# Patient Record
Sex: MALE | Race: WHITE | NOT HISPANIC OR LATINO | Employment: FULL TIME | URBAN - METROPOLITAN AREA
[De-identification: names, ages, dates, MRNs, and addresses within clinical notes are randomized per-mention and may not be internally consistent; named-entity substitution may affect disease eponyms.]

---

## 2020-11-15 ENCOUNTER — NON-PROVIDER VISIT (OUTPATIENT)
Dept: URGENT CARE | Facility: CLINIC | Age: 55
End: 2020-11-15

## 2020-11-15 DIAGNOSIS — Z02.1 PRE-EMPLOYMENT DRUG SCREENING: ICD-10-CM

## 2020-11-15 LAB
AMP AMPHETAMINE: NORMAL
COC COCAINE: NORMAL
INT CON NEG: NORMAL
INT CON POS: NORMAL
MET METHAMPHETAMINES: NORMAL
OPI OPIATES: NORMAL
PCP PHENCYCLIDINE: NORMAL
POC DRUG COMMENT 753798-OCCUPATIONAL HEALTH: NEGATIVE
THC: NORMAL

## 2020-11-15 PROCEDURE — 80305 DRUG TEST PRSMV DIR OPT OBS: CPT | Performed by: NURSE PRACTITIONER

## 2021-07-30 ENCOUNTER — HOSPITAL ENCOUNTER (EMERGENCY)
Facility: MEDICAL CENTER | Age: 56
End: 2021-07-30
Attending: EMERGENCY MEDICINE
Payer: OTHER MISCELLANEOUS

## 2021-07-30 ENCOUNTER — OCCUPATIONAL MEDICINE (OUTPATIENT)
Dept: URGENT CARE | Facility: CLINIC | Age: 56
End: 2021-07-30
Payer: OTHER MISCELLANEOUS

## 2021-07-30 ENCOUNTER — APPOINTMENT (OUTPATIENT)
Dept: RADIOLOGY | Facility: IMAGING CENTER | Age: 56
End: 2021-07-30
Attending: NURSE PRACTITIONER
Payer: OTHER MISCELLANEOUS

## 2021-07-30 VITALS
HEIGHT: 65 IN | WEIGHT: 161 LBS | TEMPERATURE: 98.3 F | SYSTOLIC BLOOD PRESSURE: 128 MMHG | HEART RATE: 77 BPM | RESPIRATION RATE: 20 BRPM | DIASTOLIC BLOOD PRESSURE: 76 MMHG | BODY MASS INDEX: 26.82 KG/M2 | OXYGEN SATURATION: 99 %

## 2021-07-30 VITALS
RESPIRATION RATE: 14 BRPM | SYSTOLIC BLOOD PRESSURE: 141 MMHG | WEIGHT: 161 LBS | DIASTOLIC BLOOD PRESSURE: 93 MMHG | HEIGHT: 65 IN | TEMPERATURE: 98.4 F | HEART RATE: 89 BPM | BODY MASS INDEX: 26.82 KG/M2 | OXYGEN SATURATION: 96 %

## 2021-07-30 DIAGNOSIS — M25.562 ACUTE PAIN OF LEFT KNEE: ICD-10-CM

## 2021-07-30 DIAGNOSIS — M25.461 PAIN AND SWELLING OF RIGHT KNEE: ICD-10-CM

## 2021-07-30 DIAGNOSIS — M25.662 DECREASED RANGE OF MOTION (ROM) OF LEFT KNEE: ICD-10-CM

## 2021-07-30 DIAGNOSIS — M25.462 EFFUSION OF LEFT KNEE: ICD-10-CM

## 2021-07-30 DIAGNOSIS — M25.561 PAIN AND SWELLING OF RIGHT KNEE: ICD-10-CM

## 2021-07-30 LAB
APPEARANCE FLD: NORMAL
BASOPHILS NFR FLD: 0 %
BODY FLD TYPE: NORMAL
BODY FLD TYPE: NORMAL
COLOR FLD: YELLOW
CRYSTALS FLD MICRO: NORMAL
EOSINOPHIL NFR FLD: 0 %
GLUCOSE FLD-MCNC: 141 MG/DL
GRAM STN SPEC: NORMAL
HISTIOCYTES NFR FLD: 0 %
LYMPHOCYTES NFR FLD: 19 %
MESOTHL CELL NFR FLD: 0 %
MONONUC CELLS NFR FLD: 36 %
NEUTROPHILS NFR FLD: 45 %
PROT FLD-MCNC: 2.6 G/DL
RBC # FLD: NORMAL CELLS/UL
SIGNIFICANT IND 70042: NORMAL
SITE SITE: NORMAL
SOURCE SOURCE: NORMAL
WBC # FLD: 106 CELLS/UL
WBC OTHER NFR FLD: 0 %

## 2021-07-30 PROCEDURE — 80500 HCHG CLINICAL PATH CONSULT-LIMITED: CPT

## 2021-07-30 PROCEDURE — 87205 SMEAR GRAM STAIN: CPT

## 2021-07-30 PROCEDURE — 87070 CULTURE OTHR SPECIMN AEROBIC: CPT

## 2021-07-30 PROCEDURE — 20610 DRAIN/INJ JOINT/BURSA W/O US: CPT

## 2021-07-30 PROCEDURE — 73562 X-RAY EXAM OF KNEE 3: CPT | Mod: TC,LT | Performed by: NURSE PRACTITIONER

## 2021-07-30 PROCEDURE — 89060 EXAM SYNOVIAL FLUID CRYSTALS: CPT

## 2021-07-30 PROCEDURE — 84157 ASSAY OF PROTEIN OTHER: CPT

## 2021-07-30 PROCEDURE — 700101 HCHG RX REV CODE 250: Performed by: EMERGENCY MEDICINE

## 2021-07-30 PROCEDURE — 82945 GLUCOSE OTHER FLUID: CPT

## 2021-07-30 PROCEDURE — A9270 NON-COVERED ITEM OR SERVICE: HCPCS | Performed by: EMERGENCY MEDICINE

## 2021-07-30 PROCEDURE — 99284 EMERGENCY DEPT VISIT MOD MDM: CPT

## 2021-07-30 PROCEDURE — 700102 HCHG RX REV CODE 250 W/ 637 OVERRIDE(OP): Performed by: EMERGENCY MEDICINE

## 2021-07-30 PROCEDURE — 89051 BODY FLUID CELL COUNT: CPT

## 2021-07-30 PROCEDURE — 99203 OFFICE O/P NEW LOW 30 MIN: CPT | Performed by: NURSE PRACTITIONER

## 2021-07-30 RX ORDER — LIDOCAINE HYDROCHLORIDE AND EPINEPHRINE BITARTRATE 20; .01 MG/ML; MG/ML
10 INJECTION, SOLUTION SUBCUTANEOUS ONCE
Status: DISCONTINUED | OUTPATIENT
Start: 2021-07-30 | End: 2021-07-30

## 2021-07-30 RX ORDER — IBUPROFEN 600 MG/1
600 TABLET ORAL
Qty: 20 TABLET | Refills: 0 | Status: SHIPPED | OUTPATIENT
Start: 2021-07-30 | End: 2023-09-05

## 2021-07-30 RX ORDER — IBUPROFEN 600 MG/1
600 TABLET ORAL ONCE
Status: COMPLETED | OUTPATIENT
Start: 2021-07-30 | End: 2021-07-30

## 2021-07-30 RX ORDER — HYDROCODONE BITARTRATE AND ACETAMINOPHEN 5; 325 MG/1; MG/1
1-2 TABLET ORAL EVERY 6 HOURS PRN
Qty: 10 TABLET | Refills: 0 | Status: SHIPPED | OUTPATIENT
Start: 2021-07-30 | End: 2021-08-01

## 2021-07-30 RX ORDER — LIDOCAINE HYDROCHLORIDE AND EPINEPHRINE BITARTRATE 20; .01 MG/ML; MG/ML
10 INJECTION, SOLUTION SUBCUTANEOUS ONCE
Status: COMPLETED | OUTPATIENT
Start: 2021-07-30 | End: 2021-07-30

## 2021-07-30 RX ADMIN — IBUPROFEN 600 MG: 600 TABLET ORAL at 15:09

## 2021-07-30 RX ADMIN — LIDOCAINE HYDROCHLORIDE AND EPINEPHRINE 10 ML: 20; 10 INJECTION, SOLUTION INFILTRATION; PERINEURAL at 15:11

## 2021-07-30 ASSESSMENT — PAIN DESCRIPTION - PAIN TYPE: TYPE: ACUTE PAIN

## 2021-07-30 NOTE — LETTER
[facility] [place] [id number] [facility] [address] [place] [address]  Phone:  [phone] - Fax:  [phone]   [facility] Progress Report and Disability Certification  Date of Service: 7/30/2021   No Show:  No  Date / Time of Next Visit: 8/2/2021 11:30 AM   Claim Information   Patient Name: Duran Wilcox  Claim Number:     Employer: [employer]  Date of Injury: 7/26/2021     Insurer / TPA: [employer]  ID / SSN:     Occupation: Temp  Diagnosis: Diagnoses of Acute pain of left knee, Decreased range of motion (ROM) of left knee, and Pain and swelling of left knee were pertinent to this visit.    Medical Information   Related to Industrial Injury? Yes    Subjective Complaints:  DOI: 7/26/21  First Visit  Patient presents today with complaint of acute pain to the left knee.  Initial injury occurred on 7/26.  Patient states he was stacking boxes when he fell into a hole where a dock ramp was removed.  He fell through the hole with his right leg, and injured the right lateral thigh and hip area.  He states that has improved.  States he was initially having pain walking on his right leg and was limping on his left leg to compensate.  Over the last 48 hours, has noted increasing pain to the left knee on the opposite leg.  States now he is unable to bend the left knee without significant pain.   Objective Findings: There is swelling and erythema noted over the left knee.  Positive point tenderness to the anterior knee. Patient is unable to bend the left knee.  The knee is warm to palpation. Unable to assess laxity secondary to point tenderness.No pain above or below the knee, no point tenderness to the posterior knee.   Pre-Existing Condition(s):     Assessment:   Initial Visit    Status: Additional Care Required  Permanent Disability:No    Plan:      Diagnostics: X-ray    Comments:       Disability Information   Status: Temporarily Totally Disabled    From:   7/30/2021  Through: 8/2/2021 Restrictions are: Temporary   Physical Restrictions   Sitting:    Standing:    Stooping:    Bending:      Squatting:    Walking:    Climbing:    Pushing:      Pulling:    Other:    Reaching Above Shoulder (L):   Reaching Above Shoulder (R):       Reaching Below Shoulder (L):    Reaching Below Shoulder (R):      Not to exceed Weight Limits   Carrying(hrs):   Weight Limit(lb):   Lifting(hrs):   Weight  Limit(lb):     Comments: The left knee is erythematous with significant point tenderness and patient is unable to bend the knee.  I am concerned for the possibility of a septic arthritis.  X-ray is negative.  At this time, patient is referred to [facility] for further evaluation.  Consulted occupational health Dr. Carlos Melissa regarding this patient.     Repetitive Actions   Hands: i.e. Fine Manipulations from Grasping:     Feet: i.e. Operating Foot Controls:     Driving / Operate Machinery:     Provider Name:   Cathey J Hamman, A.P.N. Physician Signature:  Physician Name:     Clinic Name / Location: [address] [address] Clinic Phone Number: Dept: [phone]   Appointment Time: 11:00 Am Visit Start Time: 11:46 AM   Check-In Time:  11:12 Am Visit Discharge Time:  12:30 PM   Original-Treating Physician or Chiropractor    Page 2-Insurer/TPA    Page 3-Employer    Page 4-Employee

## 2021-07-30 NOTE — PROGRESS NOTES
"Subjective:      Duran Wilcox is a 56 y.o. male who presents with Other (NEW WC DOI: 07/26/21 (L) knee, cant bend, painful x 3 days )      DOI: 7/26/21  First Visit  Patient presents today with complaint of acute pain to the left knee.  Initial injury occurred on 7/26.  Patient states he was stacking boxes when he fell into a hole where a dock ramp was removed.  He fell through the hole with his right leg, and injured the right lateral thigh and hip area.  He states that has improved.  States he was initially having pain walking on his right leg and was limping on his left leg to compensate.  Over the last 48 hours, has noted increasing pain to the left knee on the opposite leg.  States now he is unable to bend the left knee without significant pain.     Other  This is a new problem. Episode onset: 7/26/21. The problem occurs constantly. The problem has been gradually worsening. Exacerbated by: walking, movement. He has tried nothing for the symptoms. The treatment provided no relief.       Review of Systems   Musculoskeletal:        Left knee pain and swelling   All other systems reviewed and are negative.         Objective:     /76 (BP Location: Left arm, Patient Position: Standing, BP Cuff Size: Adult)   Pulse 77   Temp 36.8 °C (98.3 °F) (Temporal)   Resp 20   Ht 1.651 m (5' 5\")   Wt 73 kg (161 lb)   SpO2 99%   BMI 26.79 kg/m²      Physical Exam  Vitals reviewed.   Constitutional:       Appearance: Normal appearance.   Musculoskeletal:        Legs:       Comments: Contusion and point tenderness to the lateral right thigh.    Erythema, redness, and point tenderness to the anterior left knee.  Area is warm to touch.   Neurological:      Mental Status: He is alert.   Psychiatric:         Mood and Affect: Mood normal.         Behavior: Behavior normal.         Thought Content: Thought content normal.         Judgment: Judgment normal.         There is swelling and mild erythema noted over the left knee. "  Positive point tenderness to the anterior knee.  Range of motion is decreased with bending.  Unable to assess for laxity secondary to pain and tenderness.     XR knee:       7/30/2021 12:08 PM     HISTORY/REASON FOR EXAM:  Knee pain     TECHNIQUE/EXAM DESCRIPTION AND NUMBER OF VIEWS:  3 views of the LEFT knee.     COMPARISON: None     FINDINGS:     BONE MINERALIZATION: Normal.  JOINTS: Small knee effusion. No erosions.  FRACTURE: None.  DISLOCATION: None.  SOFT TISSUES: No mass.     IMPRESSION:     Small knee effusion. No fracture or dislocation.      Consulted Dr. Carlos Melissa in occupational health regarding this patient.  Patient has extreme decrease in range of motion of the left knee.  He is unable to bend the knee.  There is also significant point tenderness to the anterior knee with erythema and warmth.  I am concerned about the possibility of a septic joint.  Patient will be transferred to ER for further evaluation.     Assessment/Plan:   Acute pain of the left knee  Decreased range of motion of the left knee  Left knee swelling    D 39 for restrictions  See note above  Patient referred on to ER for further evaluation at this time     There are no diagnoses linked to this encounter.

## 2021-07-30 NOTE — LETTER
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    Medical Information   Related to Industrial Injury? Yes    Subjective Complaints:  Knee pain/swelling   Objective Findings: edema   Pre-Existing Condition(s): None known   Assessment:   Initial Visit    Status: Additional Care Required  Permanent Disability:No    Plan: MedicationTransfer Care    Diagnostics: Laboratory    Comments:       Disability Information   Status: Temporarily Totally Disabled    From:     Through:   Restrictions are:     Physical Restrictions   Sitting:    Standing:    Stooping:    Bending:      Squatting:    Walking:    Climbing:    Pushing:      Pulling:    Other:    Reaching Above Shoulder (L):   Reaching Above Shoulder (R):       Reaching Below Shoulder (L):    Reaching Below Shoulder (R):      Not to exceed Weight Limits   Carrying(hrs):   Weight Limit(lb):   Lifting(hrs):   Weight  Limit(lb):     Comments: Bed rest for the next 2 days.  Will need to follow up with [facility] this upcoming week for furhther therapy.    Repetitive Actions   Hands: i.e. Fine Manipulations from Grasping:     Feet: i.e. Operating Foot Controls:     Driving / Operate Machinery:     Physician Name: Abimael Daily Physician Signature: ABIMAEL Heller M.D. e-Signature:  , Medical Director   Clinic Name / Location: [facility], EMERGENCY DEPT  [address]  [address]  [phone]     Clinic Phone Number: Dept: [phone]   Appointment Time:  Visit Start Time:    Check-In Time:  1:35 PM Visit Discharge Time:    Original-Treating Physician or Chiropractor    Page 2-Insurer/TPA    Page 3-Employer    Page 4-Employee

## 2021-07-30 NOTE — LETTER
Duran Wilcox was seen and treated in our emergency department on 7/30/2021.  He may return to work on 08/03/2021, or until cleared by Work Diligent Board Member Services.    If you have any questions or concerns, please don't hesitate to call.      Emergency Department

## 2021-07-30 NOTE — LETTER
"EMPLOYEE’S CLAIM FOR COMPENSATION/ REPORT OF INITIAL TREATMENT  FORM C-4    EMPLOYEE’S CLAIM - PROVIDE ALL INFORMATION REQUESTED   First Name  Duran Last Name  Brenden Birthdate                    1965                Sex  male Claim Number   Home Address  [address] Age  56 y.o. Height  1.651 m (5' 5\") Weight  73 kg (161 lb) [facility] Zip  [address] Telephone  [phone] (home)    Mailing Address  [address] 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 (Be specific an answer in detail. Use additional sheet if necessary)  Fell into hole when dock ramp was removed   If you believe that you have an occupational disease, when did you first have knowledge of the disability and it relationship to your employment?  N/A Witnesses to the Accident  Two Managers      Nature of Injury or Occupational Disease  Strain  Part(s) of Body Injured or Affected  Knee (L), Upper Leg (R),     I certify that the above is true and correct to the best of my knowledge and that I have provided this information in order to obtain the benefits of [place] " [place]  I hereby authorize any physician, chiropractor, surgeon, practitioner, or other person, any hospital, including [facility] hospital, any medical service organization, any insurance company, or other institution or organization to release to each other, any medical or other information, including benefits paid or payable, pertinent to this injury or disease, except information relative to diagnosis, treatment and/or counseling for AIDS, psychological conditions, alcohol or controlled substances, for which I must give specific authorization.  A Photostat of this authorization shall be as valid as the original.     Date   Place   Employee’s Signature   THIS REPORT MUST BE COMPLETED AND MAILED WITHIN 3 WORKING DAYS OF TREATMENT   Place  [facility] [place] [facility]  Name of Facility  [facility]   Date  7/30/2021 Diagnosis  (M25.562) Acute pain of left knee  (M25.662) Decreased range of motion (ROM) of left knee  (M25.561,  M25.461) Pain and swelling of left knee Is there evidence the injured employee was under the              influence of alcohol and/or another controlled substance at the time of accident?   Hour  11:46 AM Description of Injury or Disease  Diagnoses of Acute pain of left knee, Decreased range of motion (ROM) of left knee, and Pain and swelling of left knee were pertinent to this visit. No   Treatment  Transferred to ER for higher level of care at this time.  Have you advised the patient to remain off work five days or     more? No   X-Ray Findings    Comments:small joint effusion otherwise negative    If Yes   From Date  To Date      From information given by the employee, together with medical evidence, can you directly connect this injury or occupational disease as job incurred?  Yes If No Full Duty    No Modified Duty  No   Is additional medical care by a physician indicated?  Yes If Modified Duty, Specify any Limitations /  "Restrictions      Do you know of any previous injury or disease contributing to this condition or occupational disease?                            No   Date  7/30/2021 Print Doctor’s Name   Cathey J Hamman, A.P.N. I certify the employer’s copy of  this form was mailed on:   Address  [address] Insurer[address]     [place]  [address]    Provider’s Tax ID Number  [id number] Telephone  Dept: [phone]      [email or web address]-[email or web address], CATHEY J A.P.N.  Signature:     Degree          ORIGINAL-TREATING PHYSICIAN OR CHIROPRACTOR    PAGE 2-INSURER/TPA    PAGE 3-EMPLOYER    PAGE 4-EMPLOYEE        Form C-4 (rev.10/07)           BRIEF DESCRIPTION OF RIGHTS AND BENEFITS  (Pursuant to [place] 616C.050)    Notice of Injury or Occupational Disease (Incident Report Form C-1): If an injury or occupational disease (OD) arises out of and in the course of employment, you must provide written notice to your employer as soon as practicable, but no later than 7 days after the accident or OD. Your employer shall maintain a sufficient supply of the required forms.    Claim for Compensation (Form C-4): If medical treatment is sought, the form C-4 is available at the place of initial treatment. A completed \"Claim for Compensation\" (Form C-4) must be filed within 90 days after an accident or OD. The treating physician or chiropractor must, within 3 working days after treatment, complete and mail to the employer, the employer's insurer and third-party , the Claim for Compensation.    Medical Treatment: If you require medical treatment for your on-the-job injury or OD, you may be required to select a physician or chiropractor from a list provided by your workers’ compensation insurer, if it has contracted with an Organization for Managed Care (MCO) or Preferred Provider Organization (PPO) or providers of health care. If your employer has not entered into a contract with an MCO or PPO, you may select a physician or " chiropractor from the Panel of Physicians and Chiropractors. Any medical costs related to your industrial injury or OD will be paid by your insurer.    Temporary Total Disability (TTD): If your doctor has certified that you are unable to work for a period of at least 5 consecutive days, or 5 cumulative days in a 20-day period, or places restrictions on you that your employer does not accommodate, you may be entitled to TTD compensation.    Temporary Partial Disability (TPD): If the wage you receive upon reemployment is less than the compensation for TTD to which you are entitled, the insurer may be required to pay you TPD compensation to make up the difference. TPD can only be paid for a maximum of 24 months.    Permanent Partial Disability (PPD): When your medical condition is stable and there is an indication of a PPD as a result of your injury or OD, within 30 days, your insurer must arrange for an evaluation by a rating physician or chiropractor to determine the degree of your PPD. The amount of your PPD award depends on the date of injury, the results of the PPD evaluation, your age and wage.    Permanent Total Disability (PTD): If you are medically certified by a treating physician or chiropractor as permanently and totally disabled and have been granted a PTD status by your insurer, you are entitled to receive monthly benefits not to exceed 66 2/3% of your average monthly wage. The amount of your PTD payments is subject to reduction if you previously received a lump-sum PPD award.    Vocational Rehabilitation Services: You may be eligible for vocational rehabilitation services if you are unable to return to the job due to a permanent physical impairment or permanent restrictions as a result of your injury or occupational disease.    Transportation and Per Solomon Reimbursement: You may be eligible for travel expenses and per solomon associated with medical treatment.    Reopening: You may be able to reopen your  claim if your condition worsens after claim closure.     Appeal Process: If you disagree with a written determination issued by the insurer or the insurer does not respond to your request, you may appeal to the Department of Administration, , by following the instructions contained in your determination letter. You must appeal the determination within 70 days from the date of the determination letter at [address], [address], [place], [place] [address], or [address], [place], [place] [address]. If you disagree with the  decision, you may appeal to the Department of Administration, . You must file your appeal within 30 days from the date of the  decision letter at [address], [address], [place], [place] [address], or [address], [facility] [address] [facility] [address], [place], [place] [address]. If you disagree with a decision of an , you may file a petition for judicial review with the District Court. You must do so within 30 days of the Appeal Officer’s decision. You may be represented by an  at your own expense or you may contact the [facility] for possible representation.    [place]  for Injured Workers (NAIW): If you disagree with a  decision, you may request that NAIW represent you without charge at an  Hearing. For information regarding denial of benefits, you may contact the [facility] at: [address], [address], [place] [address], [phone], or [address], [place] [address], [phone]    To File a Complaint with the Division: If you wish to file a complaint with the  of the Division of Industrial Relations (DIR),  please contact the Workers’ Compensation Section, [address], [address], [place], [place] [address], telephone ([phone]) [phone], or [address], [place], [place] [address], telephone  ([phone].    For assistance with Workers’ Compensation Issues: You may contact the [facility] Office for Consumer Health Assistance, [facility] [facility], [address] [address], [place] [address], Toll Free 1-490.194.7681, Web site: http://[facility].[place].gov/Programs/MELBA E-mail: melba@[facility].nv.[facility]              __________________________________________________________________                                    _________________            Employee Name / Signature                                                                                                                            Date                                                                                                                                                                                                                              D-2 (rev. 10/20)

## 2021-07-30 NOTE — ED TRIAGE NOTES
"Chief Complaint   Patient presents with   • Knee Pain     pt was stacking boxed at work when his R leg fell through a hole. now pt has L knee pain that has increasing gotten swollen, red and warm. pt states having decreased range of motion of L knee     Pt walk in for above. Pt able to bear weight on the L leg. Pt sent from [facility] urgent care, has already had an xray completed. Pt sent for concern of septic joint. Educated on triage process and to notify if there is any change.      /93   Pulse 89   Temp 36.9 °C (98.4 °F) (Temporal)   Resp 14   Ht 1.651 m (5' 5\")   Wt 73 kg (161 lb)   SpO2 96%   BMI 26.79 kg/m²       "

## 2021-07-31 LAB
PATH REV: NORMAL
PATH REV: NORMAL

## 2021-07-31 NOTE — ED PROVIDER NOTES
"ED Provider Note    CHIEF COMPLAINT  Chief Complaint   Patient presents with   • Knee Pain     pt was stacking boxed at work when his R leg fell through a hole. now pt has L knee pain that has increasing gotten swollen, red and warm. pt states having decreased range of motion of L knee       HPI  Duran Wilcox is a 56 y.o. male who presents planing of knee pain.  The patient was at work earlier in the week, and fell such that his right leg went through a hole on a loading dock.  He walking awkward because of that, in the last day or so, his knee has been bothering him quite a bit on the other side.  He was seen in the urgent care and sent here for further evaluation, concern for septic knee.  No history of gout.  There is no other complaint.  There is no other complaint.    PAST MEDICAL HISTORY  None    FAMILY HISTORY  No family history on file.    SOCIAL HISTORY  Social History     Tobacco Use   • Smoking status: Never Smoker   • Smokeless tobacco: Never Used   Vaping Use   • Vaping Use: Never used   Substance Use Topics   • Alcohol use: Never   • Drug use: Never         SURGICAL HISTORY  History reviewed. No pertinent surgical history.    CURRENT MEDICATIONS  Home Medications     Reviewed by Lorin Dale R.N. (Registered Nurse) on 07/30/21 at 1356  Med List Status: Complete   Medication Last Dose Status        Patient Mo Taking any Medications                       I have reviewed the nurses notes and/or the list brought with the patient.    ALLERGIES  No Known Allergies    REVIEW OF SYSTEMS  See HPI for further details. Review of systems as above, otherwise all other systems are negative.     PHYSICAL EXAM  VITAL SIGNS: /93   Pulse 89   Temp 36.9 °C (98.4 °F) (Temporal)   Resp 14   Ht 1.651 m (5' 5\")   Wt 73 kg (161 lb)   SpO2 96%   BMI 26.79 kg/m²     Constitutional: Well appearing patient in no acute distress.  Not toxic, nor ill in appearance.  HENT: Mucus membranes moist.  Oropharynx is " clear.  Eyes: Pupils equally round.  No scleral icterus.   Neck: Full nontender range of motion.  Lymphatic: No popliteal or inguinal lymphadenopathy noted.   Cardiovascular: Regular heart rate and rhythm.  No murmurs, rubs, nor gallop appreciated.   Thorax & Lungs: Chest is nontender.  Lungs are clear to auscultation with good air movement bilaterally.  No wheeze, rhonchi, nor rales.   Abdomen: Soft, with no tenderness, rebound nor guarding.  No mass, pulsatile mass, nor hepatosplenomegaly appreciated.  Skin: His bruising over his right thigh  Extremities/Musculoskeletal: The left knee is significant for slight joint effusion, it is warm to the touch.  Quite tender to the touch.  Range of motion is limited secondary to pain.  Neurologic: Alert & oriented.  Strength and sensation is intact all around.  Gait is antalgic  Psychiatric: Normal affect appropriate for the clinical situation.      LABS  Labs Reviewed   FLUID CELL COUNT   FLUID TOTAL PROTEIN   FLUID GLUCOSE   FLUID CULTURE W/GRAM STAIN   FLUID CRYSTALS   GRAM STAIN         RADIOLOGY/PROCEDURES  I have reviewed the patient's film interpretations myself, and they are read out by the radiologist as:   As read earlier is a small joint effusion    Procedure  I have obtained written informed verbal consent for arthrocentesis.  Risks being bleeding, pain, infection.  Benefit being diagnosis and potentially symptomatic improvement.  The right knee is prepped and draped in the usual sterile fashion.  A medial approach was taken.  The skin was anesthetized with 2 cc 1% lidocaine with epinephrine.  Using an 18-gauge needle, the joint space was entered, with return of clear synovial fluid.  Only 2 cc was removed.  More was not in the joint space.  As I was removing the needle, aspirated a sm amount of blood which clotted with the synovial fluid.  To be clear, this is not a hemarthrosis.  Patient tolerated the procedure with ease.  Band-Aid was applied.    COURSE &  MEDICAL DECISION MAKING  I have reviewed any medical record information, laboratory studies and radiographic results as noted above.  Patient presents with a painful swollen knee.  He does not have a history of gout.  There is no history of trauma to that knee although he has been walking awkwardly because of his thigh injury on the right.  I have a low suspicion for septic knee given his lack of comorbidities, however this is not excluded.  As well, I do worry about the possibility of gout.  Fluid was sent to the lab.  There is no evidence of significant white cells in the knee.  No bacteria on Gram stain.  As noted, he does have red cells there, however, this is not from hemarthrosis.  No crystals were seen.  At this point, going to treat him symptomatically with Ace wrap and/or knee immobilizer.  Ibuprofen.  Recommended bed rest of the weekend because of the bilateral symptoms, follow-up with [facility] occupational health beginning of the week.    I will write for hydrocodone should the ibuprofen not be working but he is to try this first.  Instructions on knee effusion.    In prescribing controlled substances to this patient, I certify that I have obtained and reviewed the medical history of Duran Wilcox. I have also made a good carlton effort to obtain applicable records from other providers who have treated the patient and records did not demonstrate any increased risk of substance abuse that would prevent me from prescribing controlled substances.     I have conducted a physical exam and documented it. I have reviewed Mr. Wilcox’s prescription history as maintained by the [place] Prescription Monitoring Program.     I have assessed the patient’s risk for abuse, dependency, and addiction using the validated Opioid Risk Tool available at https://www.mdcalc.com/ckrgbw-czuv-vyjc-ort-narcotic-abuse.     Given the above, I believe the benefits of controlled substance therapy outweigh the risks. The reasons for  prescribing controlled substances include non-narcotic, oral analgesic alternatives have been inadequate for pain control. Accordingly, I have discussed the risk and benefits, treatment plan, and alternative therapies with the patient.         FINAL IMPRESSION  1. Effusion of left knee    2.  Arthrocentesis of knee       This dictation was created using voice recognition software.    Electronically signed by: Abimael Daily M.D., 7/30/2021 5:44 PM

## 2021-07-31 NOTE — ED NOTES
Discharge instructions, prescriptions, and follow-up reviewed with pt. Pt verbalized understanding. Denies questions at this time. Pt ambulatory at discharge.

## 2021-08-02 ENCOUNTER — OCCUPATIONAL MEDICINE (OUTPATIENT)
Dept: URGENT CARE | Facility: CLINIC | Age: 56
End: 2021-08-02
Payer: OTHER MISCELLANEOUS

## 2021-08-02 VITALS
WEIGHT: 167 LBS | OXYGEN SATURATION: 97 % | BODY MASS INDEX: 27.82 KG/M2 | HEIGHT: 65 IN | TEMPERATURE: 97.7 F | HEART RATE: 82 BPM | RESPIRATION RATE: 16 BRPM | DIASTOLIC BLOOD PRESSURE: 80 MMHG | SYSTOLIC BLOOD PRESSURE: 140 MMHG

## 2021-08-02 DIAGNOSIS — M25.462 EFFUSION OF LEFT KNEE: ICD-10-CM

## 2021-08-02 DIAGNOSIS — M25.562 ACUTE PAIN OF LEFT KNEE: ICD-10-CM

## 2021-08-02 LAB
BACTERIA FLD AEROBE CULT: NORMAL
GRAM STN SPEC: NORMAL
SIGNIFICANT IND 70042: NORMAL
SITE SITE: NORMAL
SOURCE SOURCE: NORMAL

## 2021-08-02 PROCEDURE — 99214 OFFICE O/P EST MOD 30 MIN: CPT | Performed by: PHYSICIAN ASSISTANT

## 2021-08-02 NOTE — LETTER
"   [facility]  [address] [place] [address]  Phone:  [phone] - Fax:  [phone]   [facility] Progress Report and Disability Certification  Date of Service: 8/2/2021   No Show:  No  Date / Time of Next Visit: 8/5/2021 @ 10 am   Claim Information   Patient Name: Duran Wilcox  Claim Number:     Employer: [employer]  Date of Injury: 7/26/2021     Insurer / TPA: [employer]  ID / SSN:     Occupation: Temp  Diagnosis: Diagnoses of Effusion of left knee and Acute pain of left knee were pertinent to this visit.    Medical Information   Related to Industrial Injury? Yes    Subjective Complaints:     DOI: 7/26/21   Copied From Initial Visit: \"Patient presents today with complaint of acute pain to the left knee.  Initial injury occurred on 7/26.  Patient states he was stacking boxes when he fell into a hole where a dock ramp was removed.  He fell through the hole with his right leg, and injured the right lateral thigh and hip area.  He states that has improved.  States he was initially having pain walking on his right leg and was limping on his left leg to compensate.  Over the last 48 hours, has noted increasing pain to the left knee on the opposite leg.  States now he is unable to bend the left knee without significant pain.\"     Follow up today 08/02/2021: patient was evaluated in the emergency department and had arthrocentesis analysis which did not show signs of septic arthritis or acute gout.  He was treated conservatively.  Patient reports he is about 90% better and improved.  He only complains of left knee pain.  His right thigh pain is resolved other than mild pain with palpation.  His knee pain is reproduced with kneeling down on the ground.  There is mild swelling to the area.  He has been using a knee sleeve for support.  He has been using NSAIDs as needed and ice with some relief.  He has not quite ready for " trial of full duty he states.  He would rather not wait another week for follow-up.  Agreed to follow-up in 3 days.  Denies any numbness, tingling, weakness.  There is no distal swelling.  No fevers or chills.   Objective Findings: Constitutional: Well-appearing in no acute distress  Left knee: Full range of motion.  There is focal tenderness to palpation to the medial suprapatellar area.  Negative patellar instability.  There is slight knee effusion and slight erythema most likely due to the compression sleeve.  No significant amount of warmth compared to the other side.  No distal edema.  Strength 5/5 with flexion and extension.  Antalgic gait. N/V intact.   Right leg: bruising to right lateral thigh. Minimal TTP. Full ROM.  Negative edema. N/V intact.    Pre-Existing Condition(s):     Assessment:   Condition Improved    Status: Additional Care Required  Permanent Disability:No    Plan:      Diagnostics:      Comments:  Plan:   Continue rest as needed, ice application, compression with sleeve, elevation.  Continue NSAIDs such as ibuprofen 400 to 600 mg every 6-8 hours as needed for pain  Gentle increase range of motion exercises and stretches  Patient would rather n  ot wait a whole week for follow-up.  Agreed to follow-up in 3 days.  He is hesitant for trial full duty  Work restrictions per D 39  Follow-up in 3 days    Disability Information   Status: Released to Restricted Duty    From:  8/2/2021  Through: 8/5/2021 Restrictions are: Temporary   Physical Restrictions   Sitting:    Standing:    Stooping:    Bending:      Squatting:    Walking:    Climbing:    Pushing:      Pulling:    Other:    Reaching Above Shoulder (L):   Reaching Above Shoulder (R):       Reaching Below Shoulder (L):    Reaching Below Shoulder (R):      Not to exceed Weight Limits   Carrying(hrs):   Weight Limit(lb):   Lifting(hrs):   Weight  Limit(lb):     Comments: No lifting, pushing, pulling more than 25 pounds  No kneeling, stooping, or  squatting  Please allow patient to elevate knee and ice knee for approximately 10 minutes every couple hours as needed.    Repetitive Actions   Hands: i.e. Fine Manipulations from Grasping:     Feet: i.e. Operating Foot Controls:     Driving / Operate Machinery:     Provider Name:   Jaxson Hewitt P.A.-C. Physician Signature:  Physician Name:     Clinic Name / Location: [address] [place] [address]  [place] [address] Clinic Phone Number: Dept: [phone]   Appointment Time: 11:30 Am Visit Start Time: 11:58 AM   Check-In Time:  11:46 Am Visit Discharge Time:  0131 pm    Original-Treating Physician or Chiropractor    Page 2-Insurer/TPA    Page 3-Employer    Page 4-Employee

## 2021-08-02 NOTE — PROGRESS NOTES
"Subjective:     Duran Wilcox is a 56 y.o. male who presents for Knee Pain ( [facility] DOI: 7/26/21  Left knee injury, the patient stated he's doing a lot better, painful to sit down.)         DOI: 7/26/21   Copied From Initial Visit: \"Patient presents today with complaint of acute pain to the left knee.  Initial injury occurred on 7/26.  Patient states he was stacking boxes when he fell into a hole where a dock ramp was removed.  He fell through the hole with his right leg, and injured the right lateral thigh and hip area.  He states that has improved.  States he was initially having pain walking on his right leg and was limping on his left leg to compensate.  Over the last 48 hours, has noted increasing pain to the left knee on the opposite leg.  States now he is unable to bend the left knee without significant pain.\"     Follow up today 08/02/2021: patient was evaluated in the emergency department and had arthrocentesis analysis which did not show signs of septic arthritis or acute gout.  He was treated conservatively.  Patient reports he is about 90% better and improved.  He only complains of left knee pain.  His right thigh pain is resolved other than mild pain with palpation.  His knee pain is reproduced with kneeling down on the ground.  There is mild swelling to the area.  He has been using a knee sleeve for support.  He has been using NSAIDs as needed and ice with some relief.  He has not quite ready for trial of full duty he states.  He would rather not wait another week for follow-up.  Agreed to follow-up in 3 days.  Denies any numbness, tingling, weakness.  There is no distal swelling.  No fevers or chills.    PMH:   No pertinent past medical history to this problem  MEDS:  Medications were reviewed in EMR  ALLERGIES:  Allergies were reviewed in EMR  SOCHX:  Works as  FH:   No pertinent family history to this problem       Objective:     /80 (BP Location: Left arm, Patient Position: Sitting, BP Cuff " "Size: Adult)   Pulse 82   Temp 36.5 °C (97.7 °F) (Temporal)   Resp 16   Ht 1.651 m (5' 5\")   Wt 75.8 kg (167 lb)   SpO2 97%   BMI 27.79 kg/m²     Constitutional: Well-appearing in no acute distress  Left knee: Full range of motion.  There is focal tenderness to palpation to the medial suprapatellar area.  Negative patellar instability.  There is slight knee effusion and slight erythema most likely due to the compression sleeve.  No significant amount of warmth compared to the other side.  No distal edema.  Strength 5/5 with flexion and extension.  Antalgic gait. N/V intact.   Right leg: bruising to right lateral thigh. Minimal TTP. Full ROM.  Negative edema. N/V intact.     Assessment/Plan:       1. Effusion of left knee    2. Acute pain of left knee    • Released to Restricted Duty FROM 8/2/2021 TO 8/5/2021  • No lifting, pushing, pulling more than 25 pounds  • No kneeling, stooping, or squatting  • Please allow patient to elevate knee and ice knee for approximately 10 minutes every couple hours as needed.  • Plan:   • Continue rest as needed, ice application, compression with sleeve, elevation.  • Continue NSAIDs such as ibuprofen 400 to 600 mg every 6-8 hours as needed for pain  • Gentle increase range of motion exercises and stretches  • Patient would rather n  • ot wait a whole week for follow-up.  Agreed to follow-up in 3 days.  • He is hesitant for trial full duty  • Work restrictions per D 39  • Follow-up in 3 days    Suspected bursitis.     Differential diagnosis, natural history, supportive care, and indications for immediate follow-up discussed.    Time spent evaluating the patient was 30 minutes which included preparing for the visit, obtaining history, examination, independent interpretation, discussion of plan, counseling/education, medical information reconciliation, and documentation into chart.   "

## 2021-08-05 ENCOUNTER — OCCUPATIONAL MEDICINE (OUTPATIENT)
Dept: URGENT CARE | Facility: CLINIC | Age: 56
End: 2021-08-05
Payer: OTHER MISCELLANEOUS

## 2021-08-05 VITALS
TEMPERATURE: 97.5 F | RESPIRATION RATE: 16 BRPM | DIASTOLIC BLOOD PRESSURE: 70 MMHG | SYSTOLIC BLOOD PRESSURE: 132 MMHG | OXYGEN SATURATION: 100 % | HEART RATE: 74 BPM

## 2021-08-05 DIAGNOSIS — M25.462 EFFUSION OF BURSA OF LEFT KNEE: ICD-10-CM
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
## 2021-08-05 DIAGNOSIS — M25.562 LEFT KNEE PAIN, UNSPECIFIED CHRONICITY: ICD-10-CM

## 2021-08-05 PROCEDURE — 99213 OFFICE O/P EST LOW 20 MIN: CPT | Performed by: PHYSICIAN ASSISTANT

## 2021-08-05 ASSESSMENT — ENCOUNTER SYMPTOMS: FALLS: 1

## 2021-08-05 NOTE — LETTER
"   [facility]  [address] [place] [address]  Phone:  [phone] - Fax:  [phone]   Occupational [facility] Network Progress Report and Disability Certification  Date of Service: 8/5/2021   No Show:  No  Date / Time of Next Visit:     Claim Information   Patient Name: Duran Wilcox  Claim Number:     Employer: [employer]  Date of Injury: 7/26/2021     Insurer / TPA: [employer] Workers [employer]  ID / SSN:     Occupation: Temp  Diagnosis: Diagnoses of Fall, sequela, Left knee pain, unspecified chronicity, and Effusion of bursa of left knee were pertinent to this visit.    Medical Information   Related to Industrial Injury? Yes    Subjective Complaints:  DOI: 7/26/21- visit #3 (through )-patient reports feeling notably better at this time and denies any pain, swelling and does report the redness and increased warmth have all improved.  He admits that he has been conducting light duty without difficulty and does feel that he is ready to conduct full duty.    Copied From previous visits: \"Patient presents today with complaint of acute pain to the left knee.  Initial injury occurred on 7/26.  Patient states he was stacking boxes when he fell into a hole where a dock ramp was removed.  He fell through the hole with his right leg, and injured the right lateral thigh and hip area.  He states that has improved.  States he was initially having pain walking on his right leg and was limping on his left leg to compensate.  Over the last 48 hours, has noted increasing pain to the left knee on the opposite leg.  States now he is unable to bend the left knee without significant pain.\"     08/02/2021: patient was evaluated in the emergency department and had arthrocentesis analysis which did not show signs of septic arthritis or acute gout.   Objective Findings: Left knee: Minimal swelling to the anterior portion of the left knee without surrounding erythema.  Faint " ecchymosis to the medial aspect from previous site of knee drainage, full range of motion of the knee without deficit without noted bony tenderness.  Gait is within normal limits.  Able to squat without difficulty.   Pre-Existing Condition(s):     Assessment:   Condition Improved    Status: Discharged /  MMI  Permanent Disability:No    Plan:      Diagnostics:      Comments:       Disability Information   Status:      From:     Through:   Restrictions are:     Physical Restrictions   Sitting:    Standing:    Stooping:    Bending:      Squatting:    Walking:    Climbing:    Pushing:      Pulling:    Other:    Reaching Above Shoulder (L):   Reaching Above Shoulder (R):       Reaching Below Shoulder (L):    Reaching Below Shoulder (R):      Not to exceed Weight Limits   Carrying(hrs):   Weight Limit(lb):   Lifting(hrs):   Weight  Limit(lb):     Comments: Patient is profoundly improved at this time without residual deficit.  Patient is discharged full MMI at this time.  He will return to clinic for any further concerns.    Repetitive Actions   Hands: i.e. Fine Manipulations from Grasping:     Feet: i.e. Operating Foot Controls:     Driving / Operate Machinery:     Provider Name:   Raciel Grier P.A.-C. Physician Signature:  Physician Name:     Clinic Name / Location: [address] [place] [address] Clinic Phone Number: Dept: [phone]   Appointment Time: 10:00 Am Visit Start Time: 10:09 AM   Check-In Time:  9:56 Am Visit Discharge Time:  1027 am    Original-Treating Physician or Chiropractor    Page 2-Insurer/TPA    Page 3-Employer    Page 4-Employee

## 2021-08-05 NOTE — PROGRESS NOTES
"Subjective:      Duran Wilcox is a 56 y.o. male who presents with Knee Injury ([facility] pt states he is doing a lot better)      DOI: 7/26/21- visit #3 (through )-patient reports feeling notably better at this time and denies any pain, swelling and does report the redness and increased warmth have all improved.  He admits that he has been conducting light duty without difficulty and does feel that he is ready to conduct full duty.    Copied From previous visits: \"Patient presents today with complaint of acute pain to the left knee.  Initial injury occurred on 7/26.  Patient states he was stacking boxes when he fell into a hole where a dock ramp was removed.  He fell through the hole with his right leg, and injured the right lateral thigh and hip area.  He states that has improved.  States he was initially having pain walking on his right leg and was limping on his left leg to compensate.  Over the last 48 hours, has noted increasing pain to the left knee on the opposite leg.  States now he is unable to bend the left knee without significant pain.\"     08/02/2021: patient was evaluated in the emergency department and had arthrocentesis analysis which did not show signs of septic arthritis or acute gout.     Knee Injury  This is a new problem. Episode onset: 7/26. The problem occurs constantly. The problem has been resolved. Nothing aggravates the symptoms.       Review of Systems   Musculoskeletal: Positive for falls and joint pain.   All other systems reviewed and are negative.         Objective:     /70 (BP Location: Left arm, Patient Position: Sitting, BP Cuff Size: Adult long)   Pulse 74   Temp 36.4 °C (97.5 °F) (Temporal)   Resp 16   SpO2 100%      PMH: No pertinent past medical history to this problem  MEDS: Medications were reviewed in Epic  ALLERGIES: Allergies were reviewed in Epic  SOCHX: Package handling  FH: No pertinent family history to this problem    Physical Exam  Vitals reviewed. "   Constitutional:       General: He is not in acute distress.     Appearance: He is well-developed.   HENT:      Head: Normocephalic and atraumatic.   Eyes:      Conjunctiva/sclera: Conjunctivae normal.      Pupils: Pupils are equal, round, and reactive to light.   Neck:      Trachea: No tracheal deviation.   Cardiovascular:      Rate and Rhythm: Normal rate.   Pulmonary:      Effort: Pulmonary effort is normal. No respiratory distress.   Musculoskeletal:      Cervical back: Normal range of motion and neck supple.   Skin:     General: Skin is warm.   Neurological:      Mental Status: He is alert and oriented to person, place, and time.      Coordination: Coordination normal.   Psychiatric:         Behavior: Behavior normal.         Thought Content: Thought content normal.         Judgment: Judgment normal.         Left knee: Minimal swelling to the anterior portion of the left knee without surrounding erythema.  Faint ecchymosis to the medial aspect from previous site of knee drainage, full range of motion of the knee without deficit without noted bony tenderness.  Gait is within normal limits.  Able to squat without difficulty.              Assessment/Plan:        1. Fall, sequela  2. Left knee pain, unspecified chronicity  3. Effusion of bursa of left knee      Please see D 39 for further information.  Patient had significant improvement since last visit.  Patient has been conducting light duty however feels that full duty is appropriate at this time.  Patient is released to full duty at this time without residual deficit.  Patient is released full MMI at this time and will return to clinic for any further concern or issues.  Appropriate PPE worn at all times by provider.   Pt. Had face mask on throughout entirety of the visit other than oropharyngeal examination today.     DDX, Supportive care, and indications for immediate follow-up discussed with patient.    Instructed to return to clinic or nearest emergency  department if we are not available for any change in condition, further concerns, or worsening of symptoms.    The patient and/or guardian demonstrated a good understanding and agreed with the treatment plan.    Please note that this dictation was created using voice recognition software. I have made every reasonable attempt to correct obvious errors, but I expect that there are errors of grammar and possibly content that I did not discover before finalizing the note.

## 2021-11-19 ENCOUNTER — APPOINTMENT (OUTPATIENT)
Dept: MEDICAL GROUP | Facility: CLINIC | Age: 56
End: 2021-11-19

## 2022-11-18 ENCOUNTER — OFFICE VISIT (OUTPATIENT)
Dept: URGENT CARE | Facility: CLINIC | Age: 57
End: 2022-11-18
Payer: COMMERCIAL

## 2022-11-18 ENCOUNTER — APPOINTMENT (OUTPATIENT)
Dept: RADIOLOGY | Facility: IMAGING CENTER | Age: 57
End: 2022-11-18
Attending: NURSE PRACTITIONER
Payer: OTHER MISCELLANEOUS

## 2022-11-18 VITALS
OXYGEN SATURATION: 97 % | TEMPERATURE: 97 F | HEIGHT: 65 IN | HEART RATE: 89 BPM | BODY MASS INDEX: 27.06 KG/M2 | RESPIRATION RATE: 14 BRPM | SYSTOLIC BLOOD PRESSURE: 138 MMHG | WEIGHT: 162.4 LBS | DIASTOLIC BLOOD PRESSURE: 88 MMHG

## 2022-11-18 DIAGNOSIS — S93.409A SPRAIN OF ANKLE, UNSPECIFIED LATERALITY, UNSPECIFIED LIGAMENT, INITIAL ENCOUNTER: ICD-10-CM

## 2022-11-18 DIAGNOSIS — R03.0 ELEVATED BLOOD PRESSURE READING IN OFFICE WITHOUT DIAGNOSIS OF HYPERTENSION: ICD-10-CM

## 2022-11-18 DIAGNOSIS — S82.892A AVULSION FRACTURE OF ANKLE, LEFT, CLOSED, INITIAL ENCOUNTER: ICD-10-CM

## 2022-11-18 PROCEDURE — 73610 X-RAY EXAM OF ANKLE: CPT | Mod: TC,LT | Performed by: NURSE PRACTITIONER

## 2022-11-18 PROCEDURE — 99214 OFFICE O/P EST MOD 30 MIN: CPT | Performed by: NURSE PRACTITIONER

## 2022-11-18 NOTE — LETTER
November 18, 2022    To Whom It May Concern:         This is confirmation that Duran Wilcox attended his scheduled appointment with AJIT Anders on 11/18/22. Please excuse from work 11/21/22-/1122/22. Please allow Duran to wear waking boot at work.          If you have any questions please do not hesitate to call me at the phone number listed below.    Sincerely,          KEVIN Anders.  [phone]

## 2022-11-18 NOTE — PROGRESS NOTES
Patient has consented to treatment and for use of patient information for treatment and billing purposes.    Date: 11/18/22     Arrival Mode: Private Vehicle    Chief Complaint:    Chief Complaint   Patient presents with    Leg Injury     X 2 days, left ankle pain        History of Present Illness: 57 y.o.  male presents to clinic with left ankle pain.  2 days ago he stepped into a ditch and rolled his ankle.  Patient did have pain immediately after the injury and found it difficult to walk.  Patient has been using ice and elevation for the past 2 days.  Patient's pain has been consistent so he presents to clinic for evaluation.      ROS:    As stated in HPI     Pertinent Medical History:  History reviewed. No pertinent past medical history.     Pertinent Surgical History:  History reviewed. No pertinent surgical history.     Pertinent Medications:    Current Outpatient Medications on File Prior to Visit   Medication Sig Dispense Refill    ibuprofen (MOTRIN) 600 MG Tab Take 1 tablet by mouth 3 times a day with meals. 20 tablet 0     No current facility-administered medications on file prior to visit.        Allergies:    Patient has no known allergies.     Social History:  Social History     Tobacco Use    Smoking status: Never    Smokeless tobacco: Never   Vaping Use    Vaping Use: Never used   Substance Use Topics    Alcohol use: Never    Drug use: Never        No LMP for male patient.           Physical Exam:    Vitals:    11/18/22 1025   BP: 138/88   Pulse: 89   Resp: 14   Temp: 36.1 °C (97 °F)   SpO2: 97%             Physical Exam  Constitutional:       General: He is not in acute distress.     Appearance: Normal appearance. He is not toxic-appearing.   HENT:      Head: Normocephalic and atraumatic.   Musculoskeletal:      Left knee: Normal.      Left lower leg: Normal. No swelling, deformity, tenderness or bony tenderness.      Left ankle: Swelling and ecchymosis present. Tenderness present over the lateral  malleolus. Decreased range of motion. Anterior drawer test negative. Normal pulse.      Left Achilles Tendon: Normal.      Left foot: Normal. Normal capillary refill. No swelling or tenderness. Normal pulse.   Neurological:      Mental Status: He is alert.                Diagnostics:      DX-ANKLE 3+ VIEWS LEFT    Result Date: 11/18/2022 11/18/2022 10:39 AM HISTORY/REASON FOR EXAM:  Pain/Deformity Following Trauma; pain lateral, injury two days ago. TECHNIQUE/EXAM DESCRIPTION AND NUMBER OF VIEWS:  3 views of the  LEFT ankle. COMPARISON: None FINDINGS: MINERALIZATION: Mineralization is unremarkable for age. INJURY: No acute fracture or gross malalignment is seen. Well-corticated osseous fragments distal to the medial malleolus. JOINTS: No erosive arthropathy is evident.  Ankle mortise is symmetric. Ankle joint effusion.     1.  No radiographic evidence of acute fracture or dislocation. 2.  Ankle joint effusion. If there is concern for internal derangement, nonemergent MRI can be performed. 3.  Remote avulsion fracture of the medial malleolus.      Diagnostics interpreted by myself.  Do agree with radiology read.    Medical Decision making and clinic course :  I personally reviewed prior external notes and test results pertinent to today's visit.   Shared decision-making was utilized with patient for treatment plan.  Did obtain in clinic x-ray of the left ankle.  Remote fracture of the lmedial malleoli is noted on xray.  Although no acute fractures noted.  Patient is placed in a walking boot for comfort and stability.  Recommended rest ice and elevation.  Will refer to Ortho for follow-up.  We will provide a work note      The patient remained stable during the urgent care visit.    Plan:    Medication discussed included indication for use and the potential benefits and side effects.         1. Sprain of ankle, unspecified laterality, unspecified ligament, initial encounter    - DX-ANKLE 3+ VIEWS LEFT  - Referral to  Orthopedics    2. Elevated blood pressure reading in office without diagnosis of hypertension    - Referral to establish with [facility] PCP    3. Avulsion fracture of ankle, left, closed, initial encounter    - Referral to Orthopedics     Printed education was provided regarding the aforementioned assessments.  All of the patient's questions were answered to their satisfaction at the time of discharge.    Follow up:        Patient was encouraged to monitor symptoms closely. Those signs and symptoms which would warrant concern and mandate seeking a higher level of service through the emergency department discussed at length and included in discharge papers.  Patient stated agreement and understanding of this plan of care.    Disposition:  Home in stable condition       Voice Recognition Disclaimer:  Portions of this document were created using voice recognition software. The software does have a chance of producing errors of grammar and possibly content. I have made every reasonable attempt to correct obvious errors, but there may be errors of grammar and possibly content that I did not discover before finalizing the documentation.    KEVIN Anders.

## 2022-11-22 ENCOUNTER — TELEPHONE (OUTPATIENT)
Dept: HEALTH INFORMATION MANAGEMENT | Facility: OTHER | Age: 57
End: 2022-11-22
Payer: COMMERCIAL

## 2023-02-12 ENCOUNTER — OFFICE VISIT (OUTPATIENT)
Dept: URGENT CARE | Facility: PHYSICIAN GROUP | Age: 58
End: 2023-02-12
Payer: COMMERCIAL

## 2023-02-12 VITALS
TEMPERATURE: 98.5 F | DIASTOLIC BLOOD PRESSURE: 60 MMHG | HEIGHT: 65 IN | HEART RATE: 89 BPM | BODY MASS INDEX: 26.33 KG/M2 | OXYGEN SATURATION: 98 % | WEIGHT: 158 LBS | SYSTOLIC BLOOD PRESSURE: 118 MMHG | RESPIRATION RATE: 20 BRPM

## 2023-02-12 DIAGNOSIS — M25.572 ACUTE LEFT ANKLE PAIN: ICD-10-CM

## 2023-02-12 PROCEDURE — 99213 OFFICE O/P EST LOW 20 MIN: CPT | Performed by: PHYSICIAN ASSISTANT

## 2023-02-12 RX ORDER — INDOMETHACIN 50 MG/1
50 CAPSULE ORAL 3 TIMES DAILY
Qty: 21 CAPSULE | Refills: 0 | Status: SHIPPED | OUTPATIENT
Start: 2023-02-12 | End: 2023-02-19

## 2023-02-12 NOTE — LETTER
February 12, 2023         Patient: Duran Wilcox   YOB: 1965   Date of Visit: 2/12/2023           To Whom it May Concern:    Duran Wilcox was seen in my clinic on 2/12/2023. Please excuse him for 2/12/23 - 2/13/23. He may return on 2/14/23. We appreciate your cooperation.      If you have any questions or concerns, please don't hesitate to call.        Sincerely,           Jaxson Hewitt P.A.-C.  Electronically Signed

## 2023-02-12 NOTE — PROGRESS NOTES
"Subjective:   Duran Wilcox is a 57 y.o. male who presents for Ankle Injury (Left ankle,painful,x5 days)      HPI  The patient presents to the Urgent Care with complaints of left ankle pain and swelling for 5 days. Denies any recent injury or trauma. History of of ankle sprain 11/18/22. No diet changes. Drinks alcohol occasional but no increase. Denies history of gout. Pain with weight bearing. Pain location to the front and outside of ankle. No numbness or tingling.         Medications:    ibuprofen Tabs    Allergies: Patient has no known allergies.    Problem List: Duran Wilcox does not have a problem list on file.    Surgical History:  No past surgical history on file.    Past Social Hx: Duran Wilcox  reports that he has never smoked. He has never used smokeless tobacco. He reports that he does not drink alcohol and does not use drugs.     Past Family Hx:  Duran Wilcox family history is not on file.     Problem list, medications, and allergies reviewed by myself today in Epic.     Objective:     /60 (BP Location: Right arm, Patient Position: Sitting, BP Cuff Size: Adult)   Pulse 89   Temp 36.9 °C (98.5 °F) (Temporal)   Resp 20   Ht 1.651 m (5' 5\")   Wt 71.7 kg (158 lb)   SpO2 98%   BMI 26.29 kg/m²     Physical Exam  Vitals reviewed.   Constitutional:       General: He is not in acute distress.     Appearance: Normal appearance. He is not ill-appearing or toxic-appearing.   Eyes:      Conjunctiva/sclera: Conjunctivae normal.      Pupils: Pupils are equal, round, and reactive to light.   Cardiovascular:      Rate and Rhythm: Normal rate.   Pulmonary:      Effort: Pulmonary effort is normal.   Musculoskeletal:      Cervical back: Neck supple.      Comments: Left ankle: Tenderness to palpation anterior and lateral ankle joint. Mild diffuse ankle swelling. Increased warmth. No erythema. No body tenderness. No other point bony tenderness of ankle, foot, or leg. Normal strength to both plantarflexion and " dorsiflexion. Distal neuro/vascular intact. Skin intact.        Skin:     General: Skin is warm and dry.   Neurological:      General: No focal deficit present.      Mental Status: He is alert and oriented to person, place, and time.   Psychiatric:         Mood and Affect: Mood normal.         Behavior: Behavior normal.       Diagnosis and associated orders:     1. Acute left ankle pain  - indomethacin (INDOCIN) 50 MG Cap; Take 1 Capsule by mouth 3 times a day for 7 days. Discontinue 2-3 days after symptom resolution  Dispense: 21 Capsule; Refill: 0       Comments/MDM:     Will try indomethacin for possible gout.   Elevation, ice application, Tylenol. Avoid other NSAIDs such as Ibuprofen, Advil, Motrin.   If no improvement in 5-7 days or any worsening return to the  or follow up with PCP for consideration of x-ray.        I personally reviewed prior external notes and test results pertinent to today's visit. Pathogenesis of diagnosis discussed including typical length and natural progression. Supportive care, natural history, differential diagnoses, and indications for immediate follow-up discussed. Patient expresses understanding and agrees to plan. Patient denies any other questions or concerns.     Follow-up with the primary care physician for recheck, reevaluation, and consideration of further management.    Please note that this dictation was created using voice recognition software. I have made a reasonable attempt to correct obvious errors, but I expect that there are errors of grammar and possibly content that I did not discover before finalizing the note.    This note was electronically signed by Jaxson Hewitt PA-C

## 2023-09-05 ENCOUNTER — OFFICE VISIT (OUTPATIENT)
Dept: URGENT CARE | Facility: PHYSICIAN GROUP | Age: 58
End: 2023-09-05
Payer: COMMERCIAL

## 2023-09-05 VITALS
RESPIRATION RATE: 16 BRPM | OXYGEN SATURATION: 95 % | DIASTOLIC BLOOD PRESSURE: 72 MMHG | TEMPERATURE: 97.7 F | BODY MASS INDEX: 25.79 KG/M2 | SYSTOLIC BLOOD PRESSURE: 130 MMHG | WEIGHT: 154.8 LBS | HEIGHT: 65 IN | HEART RATE: 87 BPM

## 2023-09-05 DIAGNOSIS — M22.2X1 PATELLOFEMORAL SYNDROME OF RIGHT KNEE: ICD-10-CM

## 2023-09-05 DIAGNOSIS — M70.61 GREATER TROCHANTERIC BURSITIS OF RIGHT HIP: ICD-10-CM

## 2023-09-05 PROCEDURE — 3078F DIAST BP <80 MM HG: CPT | Performed by: STUDENT IN AN ORGANIZED HEALTH CARE EDUCATION/TRAINING PROGRAM

## 2023-09-05 PROCEDURE — 3075F SYST BP GE 130 - 139MM HG: CPT | Performed by: STUDENT IN AN ORGANIZED HEALTH CARE EDUCATION/TRAINING PROGRAM

## 2023-09-05 PROCEDURE — 99213 OFFICE O/P EST LOW 20 MIN: CPT | Performed by: STUDENT IN AN ORGANIZED HEALTH CARE EDUCATION/TRAINING PROGRAM

## 2023-09-05 RX ORDER — INDOMETHACIN 50 MG/1
50 CAPSULE ORAL 3 TIMES DAILY
COMMUNITY

## 2023-09-05 RX ORDER — MELOXICAM 15 MG/1
TABLET ORAL
Qty: 30 TABLET | Refills: 0 | Status: SHIPPED | OUTPATIENT
Start: 2023-09-05

## 2023-09-05 NOTE — PROGRESS NOTES
"Subjective:   CHIEF COMPLAINT  Chief Complaint   Patient presents with    Gout     Possible gout, R knee/hip pain, difficulty sitting/standing, x1 week          HPI  Duran Wilcox is a 58 y.o. male who presents for evaluation of gout.  For 1 week he has been experiencing right greater trochanteric pain and right knee pain.  No trauma or injury.  Says symptoms are aggravated with getting up from a seated position and also with extended periods of sitting.  Also has tenderness to palpation along the right greater trochanter.  Indomethacin helps.  He has not had any fevers or chills.  No body aches.    REVIEW OF SYSTEMS  General: no fever or chills  GI: no nausea or vomiting  See HPI for further details.    PAST MEDICAL HISTORY       SURGICAL HISTORY  patient denies any surgical history    ALLERGIES  No Known Allergies    CURRENT MEDICATIONS  Home Medications       Reviewed by Hugh Obando D.O. (Physician) on 09/05/23 at [employer]  Med List Status: <None>     Medication Last Dose Status   ibuprofen (MOTRIN) 600 MG Tab PRN Flagged for Removal   indomethacin (INDOCIN) 50 MG Cap Taking Active                    SOCIAL HISTORY  Social History     Tobacco Use    Smoking status: Never    Smokeless tobacco: Never   Vaping Use    Vaping Use: Never used   Substance and Sexual Activity    Alcohol use: Yes     Comment: Occasional    Drug use: Never    Sexual activity: Not on file       FAMILY HISTORY  History reviewed. No pertinent family history.       Objective:   PHYSICAL EXAM  VITAL SIGNS: /72 (BP Location: Left arm, Patient Position: Sitting, BP Cuff Size: Adult)   Pulse 87   Temp 36.5 °C (97.7 °F) (Temporal)   Resp 16   Ht 1.651 m (5' 5\")   Wt 70.2 kg (154 lb 12.8 oz)   SpO2 95%   BMI 25.76 kg/m²     Gen: no acute distress, normal voice  Skin: dry, intact, moist mucosal membranes  Eyes: No conjunctival injection b/l  Neck: Normal range of motion. No meningeal signs.   Lungs: No increased work of breathing.  " CTAB w/ symmetric expansion  CV: RRR w/o murmurs or clicks  MSK: Right hip: Full range of motion associated with mild discernible discomfort along the greater trochanteric region with internal/external rotation.  No focal tenderness to palpation.    Right knee: No erythema, ecchymosis, edema, effusion or warmth.  Full range of motion without any discernible discomfort.  No focal TTP.  Negative Lachman test.  No pain or laxity with varus or valgus stress.    Assessment/Plan:     1. Patellofemoral syndrome of right knee  meloxicam (MOBIC) 15 MG tablet      2. Greater trochanteric bursitis of right hip  meloxicam (MOBIC) 15 MG tablet      Symptoms not related to gout.  Could be a component of DJD contributing to knee pain. Offered referral to sports medicine but patient respectfully declined.  -Ordered Mobic  -Activity as tolerated  - Return to urgent care any new/worsening symptoms or further questions or concerns.  Patient understood everything discussed.  All questions were answered.      Differential diagnosis and supportive care discussed. Follow-up as needed if symptoms worsen or fail to improve to PCP, Urgent care or Emergency Room.    Please note that this dictation was created using voice recognition software. I have made a reasonable attempt to correct obvious errors, but I expect that there are errors of grammar and possibly content that I did not discover before finalizing the note.